# Patient Record
Sex: FEMALE | Race: WHITE | Employment: OTHER | URBAN - METROPOLITAN AREA
[De-identification: names, ages, dates, MRNs, and addresses within clinical notes are randomized per-mention and may not be internally consistent; named-entity substitution may affect disease eponyms.]

---

## 2018-02-21 PROBLEM — D84.9 IMMUNOSUPPRESSION (HCC): Status: ACTIVE | Noted: 2018-02-21

## 2018-02-21 PROBLEM — Z94.0 KIDNEY REPLACED BY TRANSPLANT: Status: ACTIVE | Noted: 2017-12-14

## 2018-10-08 ENCOUNTER — HOSPITAL ENCOUNTER (OUTPATIENT)
Dept: MAMMOGRAPHY | Age: 63
Discharge: HOME | End: 2018-10-08
Attending: FAMILY MEDICINE
Payer: MEDICARE

## 2018-10-08 DIAGNOSIS — Z12.31 VISIT FOR SCREENING MAMMOGRAM: ICD-10-CM

## 2018-10-08 DIAGNOSIS — N63.0 BREAST LUMP: ICD-10-CM

## 2018-10-08 PROCEDURE — 77066 DX MAMMO INCL CAD BI: CPT

## 2018-10-08 PROCEDURE — 76642 ULTRASOUND BREAST LIMITED: CPT

## 2018-10-10 ENCOUNTER — HOSPITAL ENCOUNTER (OUTPATIENT)
Dept: MAMMOGRAPHY | Age: 63
Discharge: HOME | End: 2018-10-10
Attending: FAMILY MEDICINE
Payer: MEDICARE

## 2018-10-10 DIAGNOSIS — R92.8 ABNORMAL MAMMOGRAM OF LEFT BREAST: ICD-10-CM

## 2018-10-10 PROCEDURE — 88305 TISSUE EXAM BY PATHOLOGIST: CPT

## 2018-10-10 PROCEDURE — 19083 BX BREAST 1ST LESION US IMAG: CPT

## 2018-10-10 PROCEDURE — 74011250636 HC RX REV CODE- 250/636: Performed by: FAMILY MEDICINE

## 2018-10-10 PROCEDURE — 77065 DX MAMMO INCL CAD UNI: CPT

## 2018-10-10 RX ORDER — LIDOCAINE HYDROCHLORIDE 10 MG/ML
10 INJECTION INFILTRATION; PERINEURAL
Status: COMPLETED | OUTPATIENT
Start: 2018-10-10 | End: 2018-10-10

## 2018-10-10 RX ADMIN — LIDOCAINE HYDROCHLORIDE 10 ML: 10 INJECTION, SOLUTION INFILTRATION; PERINEURAL at 10:00

## 2018-10-11 PROBLEM — C50.912 BREAST CANCER, LEFT (HCC): Status: ACTIVE | Noted: 2018-10-11

## 2018-10-11 NOTE — PROGRESS NOTES
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 The patient had requested to be called because she is a kidney transplant patient and is heading to [address] tomorrow for an appointment, she wanted to be able to discuss her options with her MD in [facility].  The patient plans on discussing with her team and then will follow back up with me if she needs any assistance in getting anything set up, she is aware that the Radiologist has recommended her for a bilateral breast MRI and will also discuss that with her Nephrologist.

## 2019-04-09 PROBLEM — N18.30 CKD (CHRONIC KIDNEY DISEASE) STAGE 3, GFR 30-59 ML/MIN (HCC): Status: ACTIVE | Noted: 2019-04-09
